# Patient Record
Sex: MALE | Race: BLACK OR AFRICAN AMERICAN | NOT HISPANIC OR LATINO | Employment: FULL TIME | URBAN - METROPOLITAN AREA
[De-identification: names, ages, dates, MRNs, and addresses within clinical notes are randomized per-mention and may not be internally consistent; named-entity substitution may affect disease eponyms.]

---

## 2017-11-24 ENCOUNTER — OFFICE VISIT (OUTPATIENT)
Dept: URGENT CARE | Facility: CLINIC | Age: 37
End: 2017-11-24
Payer: COMMERCIAL

## 2017-11-24 VITALS
OXYGEN SATURATION: 93 % | HEART RATE: 138 BPM | DIASTOLIC BLOOD PRESSURE: 86 MMHG | BODY MASS INDEX: 21 KG/M2 | WEIGHT: 150 LBS | HEIGHT: 71 IN | TEMPERATURE: 103 F | SYSTOLIC BLOOD PRESSURE: 135 MMHG | RESPIRATION RATE: 17 BRPM

## 2017-11-24 DIAGNOSIS — K92.2 LOWER GI BLEEDING: ICD-10-CM

## 2017-11-24 DIAGNOSIS — B20 HIV (HUMAN IMMUNODEFICIENCY VIRUS INFECTION): ICD-10-CM

## 2017-11-24 DIAGNOSIS — R50.9 FEVER, UNSPECIFIED FEVER CAUSE: Primary | ICD-10-CM

## 2017-11-24 PROBLEM — J18.9 PNEUMONIA OF RIGHT LUNG DUE TO INFECTIOUS ORGANISM: Status: ACTIVE | Noted: 2017-11-24

## 2017-11-24 PROCEDURE — 99203 OFFICE O/P NEW LOW 30 MIN: CPT | Mod: S$GLB,,, | Performed by: FAMILY MEDICINE

## 2017-11-24 RX ORDER — PAROXETINE 10 MG/1
10 TABLET, FILM COATED ORAL EVERY MORNING
COMMUNITY
End: 2018-03-06

## 2017-11-25 PROBLEM — I10 ESSENTIAL HYPERTENSION: Chronic | Status: ACTIVE | Noted: 2017-11-25

## 2017-11-25 PROBLEM — F32.A DEPRESSION: Chronic | Status: ACTIVE | Noted: 2017-11-25

## 2017-11-25 PROBLEM — A41.9 SEPSIS: Status: ACTIVE | Noted: 2017-11-25

## 2017-11-25 PROBLEM — R45.89 ANXIETY ABOUT HEALTH: Chronic | Status: ACTIVE | Noted: 2017-11-25

## 2017-11-25 PROBLEM — B20 HIV (HUMAN IMMUNODEFICIENCY VIRUS INFECTION): Status: ACTIVE | Noted: 2017-11-25

## 2017-11-25 NOTE — PATIENT INSTRUCTIONS
Go to the [facility] ER for further evaluation and treatment of high fever, untreated HIV for 10 years and lower GI bleeding.    Talha Simpson MD

## 2017-11-25 NOTE — PROGRESS NOTES
"Subjective:       Patient ID: Gera Garcia is a 37 y.o. male.    Vitals:  height is 5' 11" (1.803 m) and weight is 68 kg (150 lb). His temperature is 102.8 °F (39.3 °C) (abnormal). His blood pressure is 135/86 and his pulse is 138 (abnormal). His respiration is 17 and oxygen saturation is 93% (abnormal).     Chief Complaint: Exposure to STD; URI (pt said he went to ER recently and is still sick); and Rectal Bleeding (pt said that he has been bleeding for several days)    Pt said he recently went to the ER and was diagnosed with bronchitis. He said that he was given a breathing treatment and amoxicillin, but he did not finish the antibiotic. He said that he was diagnosed with HIV around 2007 and took medication for it for some time, but stopped taking the medication in 2009. He said he recently had unprotected sex.      Exposure to STD   This is a new problem. The current episode started 1 to 4 weeks ago. The problem has been unchanged. Associated symptoms include a fever, nausea and shortness of breath. Pertinent negatives include no abdominal pain, chest pain, chills, diarrhea, headaches, joint pain, rash, sore throat or vomiting. He is sexually active. He never uses condoms. It is unknown whether or not his partner has an STD.   URI    This is a new problem. The current episode started 1 to 4 weeks ago. The problem has been unchanged. The maximum temperature recorded prior to his arrival was 102 - 102.9 F. Associated symptoms include nausea. Pertinent negatives include no abdominal pain, chest pain, diarrhea, headaches, joint pain, rash, sore throat or vomiting. The treatment provided no relief.     Review of Systems   Constitution: Positive for decreased appetite, fever, weakness and weight loss. Negative for chills.   HENT: Negative for sore throat.    Eyes: Negative for blurred vision.   Cardiovascular: Negative for chest pain.   Respiratory: Positive for shortness of breath.    Skin: Negative for rash. "   Musculoskeletal: Negative for back pain and joint pain.   Gastrointestinal: Positive for nausea. Negative for abdominal pain, diarrhea and vomiting.   Genitourinary: Positive for hematuria.   Neurological: Negative for headaches.   Psychiatric/Behavioral: The patient is nervous/anxious.        Objective:      Physical Exam   Constitutional: He is oriented to person, place, and time. He appears well-developed and well-nourished.   HENT:   Head: Normocephalic and atraumatic.   Right Ear: External ear normal.   Left Ear: External ear normal.   Eyes: EOM are normal. Pupils are equal, round, and reactive to light.   Neck: Normal range of motion. Neck supple.   Cardiovascular: Normal rate, regular rhythm and normal heart sounds.    No murmur heard.  Pulmonary/Chest: Breath sounds normal. No respiratory distress. He has no wheezes.   Abdominal: Soft. Bowel sounds are normal. He exhibits no distension. There is no tenderness. There is no rebound and no guarding.   Musculoskeletal: Normal range of motion. He exhibits no edema, tenderness or deformity.   Lymphadenopathy:     He has no cervical adenopathy.   Neurological: He is alert and oriented to person, place, and time. He displays normal reflexes. No cranial nerve deficit. He exhibits normal muscle tone. Coordination normal.   Skin: Skin is warm. Capillary refill takes less than 2 seconds. No rash noted. No erythema. No pallor.   Psychiatric: He has a normal mood and affect. His behavior is normal. Judgment and thought content normal.   Vitals reviewed.      Assessment:       1. Fever, unspecified fever cause    2. HIV (human immunodeficiency virus infection)    3. Lower GI bleeding        Plan:         Fever, unspecified fever cause  -     Refer to Emergency Dept.    HIV (human immunodeficiency virus infection)  -     Refer to Emergency Dept.    Lower GI bleeding  -     Refer to Emergency Dept.    Go to the [facility] ER for further evaluation and treatment of  high fever, untreated HIV for 10 years and lower GI bleeding.    Talha Simpson MD

## 2017-11-26 PROBLEM — J15.20 STAPHYLOCOCCAL PNEUMONIA: Status: ACTIVE | Noted: 2017-11-26

## 2017-11-27 PROBLEM — J15.69: Status: ACTIVE | Noted: 2017-11-27

## 2017-11-29 PROBLEM — J96.01 ACUTE HYPOXEMIC RESPIRATORY FAILURE: Status: ACTIVE | Noted: 2017-11-29

## 2017-12-05 PROBLEM — T38.0X5A STEROID-INDUCED HYPERGLYCEMIA: Status: ACTIVE | Noted: 2017-12-05

## 2017-12-05 PROBLEM — E87.5 HYPERKALEMIA: Status: ACTIVE | Noted: 2017-12-05

## 2017-12-05 PROBLEM — R73.9 STEROID-INDUCED HYPERGLYCEMIA: Status: ACTIVE | Noted: 2017-12-05

## 2017-12-05 PROBLEM — E87.20 NORMAL ANION GAP METABOLIC ACIDOSIS: Status: ACTIVE | Noted: 2017-12-05

## 2017-12-07 PROBLEM — B59: Status: ACTIVE | Noted: 2017-12-07

## 2017-12-10 PROBLEM — E87.20 NORMAL ANION GAP METABOLIC ACIDOSIS: Status: RESOLVED | Noted: 2017-12-05 | Resolved: 2017-12-10

## 2017-12-10 PROBLEM — E87.5 HYPERKALEMIA: Status: RESOLVED | Noted: 2017-12-05 | Resolved: 2017-12-10

## 2017-12-11 PROBLEM — J96.01 ACUTE HYPOXEMIC RESPIRATORY FAILURE: Status: RESOLVED | Noted: 2017-11-29 | Resolved: 2017-12-11

## 2017-12-11 PROBLEM — A41.9 SEPSIS: Status: RESOLVED | Noted: 2017-11-25 | Resolved: 2017-12-11

## 2018-03-07 ENCOUNTER — TELEPHONE (OUTPATIENT)
Dept: SURGERY | Facility: CLINIC | Age: 38
End: 2018-03-07

## 2018-03-07 NOTE — TELEPHONE ENCOUNTER
----- Message from Tamara Chavez sent at 3/7/2018  1:19 PM CST -----  Contact: Pt  Pt is calling to schedule an appt for ED f/u, haley.  Pt can be reached at [phone].    Thank you

## 2018-03-08 ENCOUNTER — OFFICE VISIT (OUTPATIENT)
Dept: SURGERY | Facility: CLINIC | Age: 38
End: 2018-03-08
Payer: COMMERCIAL

## 2018-03-08 VITALS
TEMPERATURE: 98 F | HEIGHT: 71 IN | SYSTOLIC BLOOD PRESSURE: 141 MMHG | DIASTOLIC BLOOD PRESSURE: 97 MMHG | BODY MASS INDEX: 22.96 KG/M2 | HEART RATE: 86 BPM | WEIGHT: 164 LBS

## 2018-03-08 DIAGNOSIS — L02.01 ABSCESS OF FACE: Primary | ICD-10-CM

## 2018-03-08 PROCEDURE — 99204 OFFICE O/P NEW MOD 45 MIN: CPT | Mod: 25,S$GLB,, | Performed by: SURGERY

## 2018-03-08 PROCEDURE — 87186 SC STD MICRODIL/AGAR DIL: CPT

## 2018-03-08 PROCEDURE — 87205 SMEAR GRAM STAIN: CPT

## 2018-03-08 PROCEDURE — 87077 CULTURE AEROBIC IDENTIFY: CPT

## 2018-03-08 PROCEDURE — 10061 I&D ABSCESS COMP/MULTIPLE: CPT | Mod: S$GLB,,, | Performed by: SURGERY

## 2018-03-08 PROCEDURE — 99999 PR PBB SHADOW E&M-EST. PATIENT-LVL III: CPT | Mod: PBBFAC,,, | Performed by: SURGERY

## 2018-03-08 PROCEDURE — 87070 CULTURE OTHR SPECIMN AEROBIC: CPT

## 2018-03-08 RX ORDER — DARUNAVIR 800 MG/1
TABLET, FILM COATED ORAL
COMMUNITY
Start: 2018-02-21

## 2018-03-08 RX ORDER — IBUPROFEN 600 MG/1
TABLET ORAL
Refills: 0 | COMMUNITY
Start: 2018-03-02

## 2018-03-08 RX ORDER — LOPERAMIDE HYDROCHLORIDE 2 MG/1
CAPSULE ORAL
COMMUNITY
Start: 2018-01-31

## 2018-03-08 RX ORDER — ATOVAQUONE 750 MG/5ML
SUSPENSION ORAL
COMMUNITY
Start: 2018-01-25

## 2018-03-08 RX ORDER — GABAPENTIN 100 MG/1
CAPSULE ORAL
COMMUNITY
Start: 2018-01-31

## 2018-03-08 RX ORDER — METOPROLOL SUCCINATE 25 MG/1
TABLET, EXTENDED RELEASE ORAL
COMMUNITY
Start: 2018-01-24 | End: 2022-06-06 | Stop reason: ALTCHOICE

## 2018-03-08 RX ORDER — RITONAVIR 100 MG/1
TABLET, FILM COATED ORAL
COMMUNITY
Start: 2018-02-14

## 2018-03-08 RX ORDER — HYDROCODONE BITARTRATE AND ACETAMINOPHEN 5; 325 MG/1; MG/1
TABLET ORAL
Refills: 0 | COMMUNITY
Start: 2018-03-02

## 2018-03-08 NOTE — PROGRESS NOTES
Subjective:       Patient ID: Gera Garcia is a 37 y.o. male.    Chief Complaint: Consult (went to ER for abscess on chin, was lanced but no pus, here for evaluation of lump)    Patient with known HIV presents with abscess of right jaw.  Has had it for several weeks.  Being seen by Dr. Renee who ordered a CT scan which was denied by insurance.  He was sent to [facility] ED to be admitted by Dr. Renee, but they did a small I&D (got no pus according to patient, only blood - ED note of procedure not found on chart).  Here for follow up.  On doxycycline.  States it is not getting better.      Review of Systems   Constitutional: Negative for activity change, appetite change, chills, fatigue, fever and unexpected weight change.   HENT: Negative for congestion, dental problem, hearing loss, nosebleeds, sinus pressure, sore throat and voice change.    Eyes: Negative for pain and visual disturbance.   Respiratory: Negative for cough, chest tightness and shortness of breath.    Cardiovascular: Negative for chest pain, palpitations and leg swelling.   Gastrointestinal: Negative for abdominal distention, abdominal pain, constipation, diarrhea, nausea and vomiting.   Endocrine: Negative for cold intolerance and heat intolerance.   Genitourinary: Negative for difficulty urinating, flank pain, frequency and hematuria.   Musculoskeletal: Negative for arthralgias, back pain, gait problem, joint swelling, myalgias, neck pain and neck stiffness.   Skin: Positive for rash and wound.   Allergic/Immunologic: Negative for immunocompromised state.   Neurological: Negative for dizziness, tremors, seizures, syncope, weakness, light-headedness, numbness and headaches.   Hematological: Negative for adenopathy. Does not bruise/bleed easily.   Psychiatric/Behavioral: Negative for confusion, decreased concentration, hallucinations, sleep disturbance and suicidal ideas. The patient is not nervous/anxious.        Objective:      Physical Exam    Constitutional: He is oriented to person, place, and time. He appears well-developed and well-nourished.   HENT:   Head: Normocephalic and atraumatic.   Right Ear: External ear normal.   Left Ear: External ear normal.   Eyes: Conjunctivae are normal. Pupils are equal, round, and reactive to light.   Neck: Normal range of motion.       Cardiovascular: Normal rate and regular rhythm.    Pulmonary/Chest: Effort normal. No respiratory distress. He exhibits no tenderness.   Abdominal: Soft. He exhibits no distension and no mass.   Musculoskeletal: He exhibits no edema or tenderness.   Neurological: He is alert and oriented to person, place, and time. He has normal reflexes. No cranial nerve deficit.   Skin: Skin is warm and dry. No rash noted. No erythema. No pallor.   Psychiatric: He has a normal mood and affect. His behavior is normal. Judgment and thought content normal.   Nursing note and vitals reviewed.          Assessment:       1. Abscess of face        Plan:       See procedure note

## 2018-03-08 NOTE — PROCEDURES
"Incision & Drainage  Date/Time: 3/8/2018 12:14 PM  Performed by: AMILCAR VINSON.  Authorized by: AMILCAR VINSON     Time out: Immediately prior to procedure a "time out" was called to verify the correct patient, procedure, equipment, support staff and site/side marked as required.    Consent Done?:  Yes (Verbal)    Type:  Abscess  Body area:  Head/neck  Location details:  Face  Anesthesia:  Local infiltration  Local anesthetic:  Xylocaine 1% with epinephrinelidocaine 1% with epinephrine  Anesthetic total (ml):  5  Scalpel size:  11  Incision type: cruciate.  Complexity:  Complex  Drainage:  Pus  Drainage amount:  Moderate  Wound treatment:  Incision and wound packed  Packing material:  1/2 in gauze  Patient tolerance:  Patient tolerated the procedure well with no immediate complications    Return Monday for packing removal.  To [facility] ED sooner if necessary. Gram stain, C&S sent.      "

## 2018-03-08 NOTE — PROCEDURES
"Gera Garcia is a 37 y.o. male patient.    Temp: 97.8 °F (36.6 °C) (03/08/18 1054)  Pulse: 86 (03/08/18 1054)  BP: (!) 141/97 (03/08/18 1054)  Weight: 74.4 kg (164 lb 0.4 oz) (03/08/18 1054)  Height: 5' 11" (180.3 cm) (03/08/18 1054)       Procedures    Angel Mercado  3/8/2018  "

## 2018-03-09 LAB
GRAM STN SPEC: NORMAL
GRAM STN SPEC: NORMAL

## 2018-03-12 ENCOUNTER — OFFICE VISIT (OUTPATIENT)
Dept: SURGERY | Facility: CLINIC | Age: 38
End: 2018-03-12
Payer: COMMERCIAL

## 2018-03-12 VITALS — RESPIRATION RATE: 16 BRPM | TEMPERATURE: 98 F | BODY MASS INDEX: 22.96 KG/M2 | HEIGHT: 71 IN | WEIGHT: 164 LBS

## 2018-03-12 DIAGNOSIS — Z98.890 POST-OPERATIVE STATE: Primary | ICD-10-CM

## 2018-03-12 DIAGNOSIS — L02.01 ABSCESS OF FACE: ICD-10-CM

## 2018-03-12 LAB — BACTERIA SPEC AEROBE CULT: NORMAL

## 2018-03-12 PROCEDURE — 99024 POSTOP FOLLOW-UP VISIT: CPT | Mod: S$GLB,,, | Performed by: SURGERY

## 2018-03-12 PROCEDURE — 99999 PR PBB SHADOW E&M-EST. PATIENT-LVL III: CPT | Mod: PBBFAC,,, | Performed by: SURGERY

## 2018-03-12 RX ORDER — DOXYCYCLINE 100 MG/1
100 CAPSULE ORAL 2 TIMES DAILY
Qty: 20 CAPSULE | Refills: 0 | Status: SHIPPED | OUTPATIENT
Start: 2018-03-12

## 2021-04-26 ENCOUNTER — PATIENT MESSAGE (OUTPATIENT)
Dept: RESEARCH | Facility: HOSPITAL | Age: 41
End: 2021-04-26

## 2022-06-06 ENCOUNTER — HOSPITAL ENCOUNTER (EMERGENCY)
Facility: HOSPITAL | Age: 42
Discharge: HOME | End: 2022-06-06
Attending: EMERGENCY MEDICINE
Payer: COMMERCIAL

## 2022-06-06 VITALS
RESPIRATION RATE: 19 BRPM | DIASTOLIC BLOOD PRESSURE: 109 MMHG | HEIGHT: 71 IN | OXYGEN SATURATION: 100 % | TEMPERATURE: 98 F | SYSTOLIC BLOOD PRESSURE: 159 MMHG | WEIGHT: 175 LBS | BODY MASS INDEX: 24.5 KG/M2 | HEART RATE: 71 BPM

## 2022-06-06 DIAGNOSIS — R07.89 CHEST PAIN, NON-CARDIAC: Primary | ICD-10-CM

## 2022-06-06 DIAGNOSIS — I10 UNCONTROLLED HYPERTENSION: ICD-10-CM

## 2022-06-06 DIAGNOSIS — J98.01 BRONCHOSPASM: ICD-10-CM

## 2022-06-06 DIAGNOSIS — R07.9 CHEST PAIN: ICD-10-CM

## 2022-06-06 LAB
ALBUMIN SERPL BCP-MCNC: 3.9 G/DL (ref 3.5–5.2)
ALP SERPL-CCNC: 61 U/L (ref 55–135)
ALT SERPL W/O P-5'-P-CCNC: 42 U/L (ref 10–44)
ANION GAP SERPL CALC-SCNC: 9 MMOL/L (ref 8–16)
ANISOCYTOSIS BLD QL SMEAR: SLIGHT
AST SERPL-CCNC: 45 U/L (ref 10–40)
BASOPHILS # BLD AUTO: 0.03 K/UL (ref 0–0.2)
BASOPHILS NFR BLD: 0.7 % (ref 0–1.9)
BILIRUB SERPL-MCNC: 1.4 MG/DL (ref 0.1–1)
BUN SERPL-MCNC: 11 MG/DL (ref 6–20)
CALCIUM SERPL-MCNC: 9.6 MG/DL (ref 8.7–10.5)
CHLORIDE SERPL-SCNC: 107 MMOL/L (ref 95–110)
CO2 SERPL-SCNC: 24 MMOL/L (ref 23–29)
CREAT SERPL-MCNC: 1.1 MG/DL (ref 0.5–1.4)
DIFFERENTIAL METHOD: ABNORMAL
EOSINOPHIL # BLD AUTO: 0.1 K/UL (ref 0–0.5)
EOSINOPHIL NFR BLD: 2 % (ref 0–8)
ERYTHROCYTE [DISTWIDTH] IN BLOOD BY AUTOMATED COUNT: 14.1 % (ref 11.5–14.5)
EST. GFR  (AFRICAN AMERICAN): >60 ML/MIN/1.73 M^2
EST. GFR  (NON AFRICAN AMERICAN): >60 ML/MIN/1.73 M^2
GLUCOSE SERPL-MCNC: 97 MG/DL (ref 70–110)
HCT VFR BLD AUTO: 39.3 % (ref 40–54)
HGB BLD-MCNC: 12.6 G/DL (ref 14–18)
IMM GRANULOCYTES # BLD AUTO: 0.01 K/UL (ref 0–0.04)
IMM GRANULOCYTES NFR BLD AUTO: 0.2 % (ref 0–0.5)
LYMPHOCYTES # BLD AUTO: 1.9 K/UL (ref 1–4.8)
LYMPHOCYTES NFR BLD: 45.9 % (ref 18–48)
MCH RBC QN AUTO: 29.7 PG (ref 27–31)
MCHC RBC AUTO-ENTMCNC: 32.1 G/DL (ref 32–36)
MCV RBC AUTO: 93 FL (ref 82–98)
MONOCYTES # BLD AUTO: 0.5 K/UL (ref 0.3–1)
MONOCYTES NFR BLD: 11.4 % (ref 4–15)
NEUTROPHILS # BLD AUTO: 1.6 K/UL (ref 1.8–7.7)
NEUTROPHILS NFR BLD: 39.8 % (ref 38–73)
NRBC BLD-RTO: 0 /100 WBC
PLATELET # BLD AUTO: ABNORMAL K/UL (ref 150–450)
PLATELET BLD QL SMEAR: ABNORMAL
PMV BLD AUTO: ABNORMAL FL (ref 9.2–12.9)
POLYCHROMASIA BLD QL SMEAR: ABNORMAL
POTASSIUM SERPL-SCNC: 3.9 MMOL/L (ref 3.5–5.1)
PROT SERPL-MCNC: 10 G/DL (ref 6–8.4)
RBC # BLD AUTO: 4.24 M/UL (ref 4.6–6.2)
SODIUM SERPL-SCNC: 140 MMOL/L (ref 136–145)
WBC # BLD AUTO: 4.03 K/UL (ref 3.9–12.7)

## 2022-06-06 PROCEDURE — 93010 ELECTROCARDIOGRAM REPORT: CPT | Mod: ,,, | Performed by: INTERNAL MEDICINE

## 2022-06-06 PROCEDURE — 93005 ELECTROCARDIOGRAM TRACING: CPT

## 2022-06-06 PROCEDURE — 85025 COMPLETE CBC W/AUTO DIFF WBC: CPT | Performed by: EMERGENCY MEDICINE

## 2022-06-06 PROCEDURE — 25000003 PHARM REV CODE 250: Performed by: EMERGENCY MEDICINE

## 2022-06-06 PROCEDURE — 99285 EMERGENCY DEPT VISIT HI MDM: CPT | Mod: 25

## 2022-06-06 PROCEDURE — 80053 COMPREHEN METABOLIC PANEL: CPT | Performed by: EMERGENCY MEDICINE

## 2022-06-06 PROCEDURE — 93010 EKG 12-LEAD: ICD-10-PCS | Mod: ,,, | Performed by: INTERNAL MEDICINE

## 2022-06-06 RX ORDER — PREDNISONE 20 MG/1
60 TABLET ORAL DAILY
Qty: 15 TABLET | Refills: 0 | Status: SHIPPED | OUTPATIENT
Start: 2022-06-06 | End: 2022-06-11

## 2022-06-06 RX ORDER — CLONIDINE HYDROCHLORIDE 0.1 MG/1
0.1 TABLET ORAL
Status: COMPLETED | OUTPATIENT
Start: 2022-06-06 | End: 2022-06-06

## 2022-06-06 RX ORDER — PROMETHAZINE HYDROCHLORIDE AND DEXTROMETHORPHAN HYDROBROMIDE 6.25; 15 MG/5ML; MG/5ML
5 SYRUP ORAL EVERY 6 HOURS PRN
Qty: 180 ML | Refills: 0 | Status: SHIPPED | OUTPATIENT
Start: 2022-06-06 | End: 2022-06-16

## 2022-06-06 RX ORDER — AMLODIPINE BESYLATE 10 MG/1
10 TABLET ORAL DAILY
Qty: 30 TABLET | Refills: 3 | Status: SHIPPED | OUTPATIENT
Start: 2022-06-06 | End: 2023-06-06

## 2022-06-06 RX ADMIN — CLONIDINE HYDROCHLORIDE 0.1 MG: 0.1 TABLET ORAL at 11:06

## 2022-06-06 NOTE — ED TRIAGE NOTES
Pt with High blood pressure at the dentist this am.  Unable to complete treatment.   Sent to the ED.   Pt also exposed to COVID.   Pt also states he is having some wheezing and coughing.  Denies fever and chills.

## 2022-06-06 NOTE — ED PROVIDER NOTES
Encounter Date: 6/6/2022    SCRIBE #1 NOTE: I, Evan Mcbride, am scribing for, and in the presence of,  Poncho Silva MD. I have scribed the following portions of the note - Other sections scribed: HPI, ROS.       History     Chief Complaint   Patient presents with    Chest Pain     Pt reports being at the dentist's office this morning when his Bp read 164/140. Pt reports feeling palpitations and chest tightness. Pt states he did not take BP meds this morning since he ran out 1 month ago. Pt denies shortness of breath, n/v/d. No medications were given at dentist's office.     Gera Garcia is a 41 y. o male with a PMHx of HTN, asthma, pneumonia, and HIV infection, that comes to the ED complaining of intermittent chest pain described as tightness and stabbing lasting 1 second beginning this morning. The patient states he was at the dentist office when he started having complaints of dizziness, and chest pain, as well as noting his blood pressure was elevated. Patient also reports a cough, and wheezing beginning on Saturday, and one episode of epistaxis last night he endorses is resolved. Patient reports his CP might be due to him feeling anxious. Patient endorses contact with Covid-19. Reports he was on amlodipine, but is not taking it anymore. No other medications taken PTA. No alleviating or exacerbating factors noted. Patient denies shortness of breath, fever, chills, abdominal pain, nausea, vomiting, diarrhea, dysuria, headaches, congestion, sore throat, arm or leg trouble, eye pain, ear pain, rash, or other associated symptoms. Allergic to amoxicillin.    The history is provided by the patient. No  was used.     Review of patient's allergies indicates:   Allergen Reactions    Amoxicillin Nausea Only     Past Medical History:   Diagnosis Date    HIV infection     Hypertension      History reviewed. No pertinent surgical history.  Family History   Problem Relation Age of Onset     Alcohol abuse Maternal Grandfather     No Known Problems Mother     No Known Problems Father      Social History     Tobacco Use    Smoking status: Never Smoker    Smokeless tobacco: Never Used   Substance Use Topics    Alcohol use: Not Currently     Comment: pt quit    Drug use: No     Review of Systems   Constitutional: Negative for chills, diaphoresis and fever.   HENT: Positive for nosebleeds. Negative for ear pain and sore throat.    Eyes: Negative for pain.   Respiratory: Positive for cough and wheezing. Negative for shortness of breath.    Cardiovascular: Positive for chest pain.   Gastrointestinal: Negative for abdominal pain, diarrhea, nausea and vomiting.   Genitourinary: Negative for dysuria.   Musculoskeletal: Negative for back pain.        (-) Arm or leg trouble.    Skin: Negative for rash.   Neurological: Positive for dizziness. Negative for headaches.   Psychiatric/Behavioral: Negative for confusion.       Physical Exam     Initial Vitals [06/06/22 1008]   BP Pulse Resp Temp SpO2   (!) 167/102 95 15 98.3 °F (36.8 °C) 96 %      MAP       --         Physical Exam  The patient was examined specifically for the following:   General:No significant distress, Good color, Warm and dry. Head and neck:Scalp atraumatic, Neck supple. Neurological:Appropriate conversation, Gross motor deficits. Eyes:Conjugate gaze, Clear corneas. ENT: No epistaxis. Cardiac: Regular rate and rhythm, Grossly normal heart tones. Pulmonary: Wheezing, Rales. Gastrointestinal: Abdominal tenderness, Abdominal distention. Musculoskeletal: Extremity deformity, Apparent pain with range of motion of the joints. Skin: Rash.   The findings on examination were normal except for the following:  Patient's blood pressure is 167/102.  Lungs are remarkable for bilateral wheezing.  Heart tones are normal.  The patient has regular rate and rhythm.  The abdomen is nontender.  There is no guarding rebound mass or distention.  Heart tones are  normal the patient has regular rate and rhythm.  ED Course   Procedures  Labs Reviewed   COMPREHENSIVE METABOLIC PANEL - Abnormal; Notable for the following components:       Result Value    Total Protein 10.0 (*)     Total Bilirubin 1.4 (*)     AST 45 (*)     All other components within normal limits   CBC W/ AUTO DIFFERENTIAL - Abnormal; Notable for the following components:    RBC 4.24 (*)     Hemoglobin 12.6 (*)     Hematocrit 39.3 (*)     Gran # (ANC) 1.6 (*)     Platelet Estimate Clumped (*)     All other components within normal limits     EKG Readings: (Independently Interpreted)   This patient is in a sinus tachycardia with a heart rate of 106. Patient has a normal axis.  There are nonspecific T-wave changes.  There is no definite evidence of acute myocardial infarction or malignant arrhythmia.       ECG Results          EKG 12-lead (Final result)  Result time 06/06/22 16:42:42    Final result by Interface, Lab In [facility] (06/06/22 16:42:42)                 Narrative:    Test Reason : R07.89,    Vent. Rate : 106 BPM     Atrial Rate : 106 BPM     P-R Int : 138 ms          QRS Dur : 084 ms      QT Int : 332 ms       P-R-T Axes : 062 051 -10 degrees     QTc Int : 441 ms    Sinus tachycardia  T wave abnormality, consider inferior ischemia  Abnormal ECG  When compared with ECG of 24-NOV-2017 21:37,  Significant changes have occurred  Confirmed by Dom Greenberg MD ([id number]) on 6/6/2022 4:42:29 PM    Referred By: [id number]   SELF           Confirmed By:Dom Greenberg MD                            Imaging Results          X-Ray Chest AP Portable (Final result)  Result time 06/06/22 11:31:54    Final result by Neo Lyons Jr., MD (06/06/22 11:31:54)                 Impression:      No acute cardiopulmonary disease      Electronically signed by: Neo Alvarez Jr  Date:    06/06/2022  Time:    11:31             Narrative:    EXAMINATION:  XR CHEST AP PORTABLE    CLINICAL HISTORY:  chest  pain;    TECHNIQUE:  Single frontal view of the chest was performed.    COMPARISON:  None    FINDINGS:  Cardiomediastinal silhouettewithin normal limits for age.    Lungs grossly clear within limitations of portable technique    Pleura, diaphragm, and thoracic cage grossly unremarkable.                                 Medications   cloNIDine tablet 0.1 mg (0.1 mg Oral Given 6/6/22 1103)     Medical Decision Making:   History:   Old Medical Records: I decided to obtain old medical records.  Clinical Tests:   Lab Tests: Ordered and Reviewed  Radiological Study: Ordered and Reviewed  Medical Tests: Ordered and Reviewed  Given the above, this patient presents to the emergency room with sharp sticking chest pains that come and go in episodes lasting a matter of seconds.  The patient also complains of some generalized weakness.  He is not short of breath.  The patient does reports he is concerned that he may just be anxious.  He has no history of heart disease and is otherwise well.  He does have hypertension.  I will treat him for this.  I will put him on amlodipine.  This is very low risk for coronary artery disease.  The patient is low risk for pulmonary embolus.  I cannot explain his weakness.  He certainly has bronchospasm.  Chest x-ray is negative for pneumonia.  The patient already has an albuterol inhaler.        Additional MDM:   PERC Rule:   Age is greater than or equal to 50 = 0.0  Heart Rate is greater than or equal to 100 = 0.0  SaO2 on room air < 95% = 0.0  Unilateral leg swelling = 0.0  Hemoptysis = 0.0  Recent surgery or trauma = 0.0  Prior PE or DVT =  0.0  Hormone use = 0.00  PERC Score = 0       Scribe Attestation:   Scribe #1: I performed the above scribed service and the documentation accurately describes the services I performed. I attest to the accuracy of the note.                 Clinical Impression:   Final diagnoses:  [R07.9] Chest pain  [R07.89] Chest pain, non-cardiac (Primary)  [I10]  Uncontrolled hypertension  [J98.01] Bronchospasm       I personally performed the services described in this documentation.  All medical record  entries made by the scribe are at my direction and in my presence.  Signed, Dr. Silva   ED Disposition Condition    Discharge Stable        ED Prescriptions     Medication Sig Dispense Start Date End Date Auth. Provider    predniSONE (DELTASONE) 20 MG tablet Take 3 tablets (60 mg total) by mouth once daily. for 5 days 15 tablet 6/6/2022 6/11/2022 Poncho Silva MD    promethazine-dextromethorphan (PROMETHAZINE-DM) 6.25-15 mg/5 mL Syrp Take 5 mLs by mouth every 6 (six) hours as needed (cough). 180 mL 6/6/2022 6/16/2022 Poncho Silva MD    amLODIPine (NORVASC) 10 MG tablet Take 1 tablet (10 mg total) by mouth once daily. 30 tablet 6/6/2022 6/6/2023 Poncho Silva MD        Follow-up Information     Follow up With Specialties Details Why Contact Info    Azikiwe K. Lombard, MD Family Medicine In 1 week  [address] [place] [address]  [phone]             Poncho Silva MD  06/06/22 [id number]

## 2022-06-06 NOTE — DISCHARGE INSTRUCTIONS
Please follow-up with your primary care doctor the primary care doctor above in 1 week.  Return immediately if you get worse or if new problems develop.  Please continue your inhaler.  You may also use prednisone.  Amlodipine for your blood pressure.  Continue usual medicines.

## 2024-01-19 NOTE — PROGRESS NOTES
S/P I&D abscess right cheek.  Dressing changed, wound cleaned, redressed.    Instructed on wound care.      Change to doxycycline secondary to culture results.    CT approved by Medicaid.  Will get it at [facility].    Has FU appt with Dr. Renee. Tomorrow.      alone